# Patient Record
Sex: FEMALE | Race: BLACK OR AFRICAN AMERICAN | NOT HISPANIC OR LATINO | URBAN - METROPOLITAN AREA
[De-identification: names, ages, dates, MRNs, and addresses within clinical notes are randomized per-mention and may not be internally consistent; named-entity substitution may affect disease eponyms.]

---

## 2019-06-24 ENCOUNTER — EMERGENCY (EMERGENCY)
Facility: HOSPITAL | Age: 24
LOS: 0 days | Discharge: HOME | End: 2019-06-24
Attending: EMERGENCY MEDICINE | Admitting: EMERGENCY MEDICINE
Payer: MEDICAID

## 2019-06-24 ENCOUNTER — EMERGENCY (EMERGENCY)
Facility: HOSPITAL | Age: 24
LOS: 0 days | Discharge: HOME | End: 2019-06-24
Admitting: EMERGENCY MEDICINE
Payer: MEDICAID

## 2019-06-24 VITALS
TEMPERATURE: 98 F | HEIGHT: 63 IN | OXYGEN SATURATION: 98 % | RESPIRATION RATE: 20 BRPM | HEART RATE: 87 BPM | WEIGHT: 156.09 LBS | SYSTOLIC BLOOD PRESSURE: 117 MMHG | DIASTOLIC BLOOD PRESSURE: 69 MMHG

## 2019-06-24 VITALS
TEMPERATURE: 98 F | HEART RATE: 97 BPM | OXYGEN SATURATION: 98 % | RESPIRATION RATE: 18 BRPM | SYSTOLIC BLOOD PRESSURE: 124 MMHG | DIASTOLIC BLOOD PRESSURE: 59 MMHG

## 2019-06-24 DIAGNOSIS — K08.89 OTHER SPECIFIED DISORDERS OF TEETH AND SUPPORTING STRUCTURES: ICD-10-CM

## 2019-06-24 DIAGNOSIS — K02.9 DENTAL CARIES, UNSPECIFIED: ICD-10-CM

## 2019-06-24 DIAGNOSIS — F17.200 NICOTINE DEPENDENCE, UNSPECIFIED, UNCOMPLICATED: ICD-10-CM

## 2019-06-24 PROCEDURE — 99282 EMERGENCY DEPT VISIT SF MDM: CPT

## 2019-06-24 PROCEDURE — 99053 MED SERV 10PM-8AM 24 HR FAC: CPT

## 2019-06-24 PROCEDURE — 99283 EMERGENCY DEPT VISIT LOW MDM: CPT | Mod: 25

## 2019-06-24 RX ORDER — OXYCODONE AND ACETAMINOPHEN 5; 325 MG/1; MG/1
1 TABLET ORAL ONCE
Refills: 0 | Status: DISCONTINUED | OUTPATIENT
Start: 2019-06-24 | End: 2019-06-24

## 2019-06-24 RX ADMIN — OXYCODONE AND ACETAMINOPHEN 1 TABLET(S): 5; 325 TABLET ORAL at 03:03

## 2019-06-24 NOTE — ED PROVIDER NOTE - ATTENDING CONTRIBUTION TO CARE
23 year old female, no pmhx, presenting with a 2 year history of dental pain. Patient states she has not had full treatment due to having insurance issues. States today pain worsened so she came to the ED. Pain is sharp, non-radiating, worse with chewing, no palliative factors. Denies fevers, dysphagia, vomiting, voice changes, drooling, or other symptoms. Exam shows + caries to tooth # 15, 16, 17.  No signs of abscess. Will refer to dental clinic for further management.

## 2019-06-24 NOTE — ED PROVIDER NOTE - PROGRESS NOTE DETAILS
pt declined rx for abx or pain meds as she is leaving on a bus in an hr and will not be able to p/u rx; will call her dentist tomorrow for meds/visit pt declined rx for new abx as she is leaving on a bus in an hr and will not be able to p/u rx; will call her dentist tomorrow for meds/visit

## 2019-06-24 NOTE — ED PROVIDER NOTE - PROGRESS NOTE DETAILS
Toni: 24 y/o female with no pmhx presents with multiple teeth pain - patient was here earlier however dental clinic was not open at that time therefore patient presented now. Denies fevers/chills.  Exam: multiple dental caries in many different teeth, no swelling or purulent drainage noted.  Plan: follow up with dental clinic.

## 2019-06-24 NOTE — ED PROVIDER NOTE - NSFOLLOWUPCLINICS_GEN_ALL_ED_FT
A Dentist  Dentistry  .  [place]   Phone:   Fax:   Follow Up Time:     [facility]  Dental  [address] [place] [address]  Phone: [phone]  Fax:   Follow Up Time:

## 2019-06-24 NOTE — ED PROVIDER NOTE - CLINICAL SUMMARY MEDICAL DECISION MAKING FREE TEXT BOX
Patient presented with tooth pain x 2 years, otherwise afebrile, HD stable, clearing secretions without difficulty, airway patent. Referred to dental clinic for further management. Patient agreeable with plan.

## 2019-06-24 NOTE — ED PROVIDER NOTE - NS ED ROS FT
Eyes:  No visual changes, eye pain or discharge.  ENMT:  No hearing changes, pain, no sore throat or runny nose, no difficulty swallowing + dental pain  Cardiac:  No chest pain, SOB or edema. No chest pain with exertion.  Respiratory:  No cough or respiratory distress. No hemoptysis. No history of asthma or RAD.  GI:  No nausea, vomiting, diarrhea or abdominal pain.  :  No dysuria, frequency or burning.  MS:  No myalgia, muscle weakness, joint pain or back pain.  Neuro:  No headache or weakness.  No LOC.  Skin:  No skin rash.   Endocrine: No history of thyroid disease or diabetes.

## 2019-06-24 NOTE — ED PROVIDER NOTE - OBJECTIVE STATEMENT
right lower dental pain on/off for months as per pt  was told needs extraction, but waiting for ins approval to see OMFS  visiting from [place], getting on bus to go back tonight  pain is sharp, nonradiating, moderate  denies exacerbating or relieving factors  Denies fever/chill/HA/dizziness/chest pain/palpitation/sob/abd pain/n/v/d/ black stool/bloody stool/urinary sxs right lower dental pain on/off for months as per pt  was told needs extraction, but waiting for ins approval to see OMFS  visiting from [place], getting on bus to go back tonight  on amox and taking motrin without relief  pain is sharp, nonradiating, moderate  denies exacerbating or relieving factors  Denies fever/chill/HA/dizziness/chest pain/palpitation/sob/abd pain/n/v/d/ black stool/bloody stool/urinary sxs

## 2019-06-24 NOTE — ED PROVIDER NOTE - OBJECTIVE STATEMENT
23y F w/ no sig PMH presents with dental pain for 2 years. States has been having intermittent treatment secondary to insurance issues. Presents today as pain has worsened. Denies fever, chills, n/v/d, dysphagia, or numbness/tingling.

## 2019-06-24 NOTE — PROGRESS NOTE ADULT - SUBJECTIVE AND OBJECTIVE BOX
Patient is a 23y old  Female who presents with a chief complaint of     HPI:      PAST MEDICAL & SURGICAL HISTORY:  No pertinent past medical history    (   ) heart valve replacement  (   ) joint replacement  (   ) pregnancy    MEDICATIONS  (STANDING):    MEDICATIONS  (PRN):      REVIEW OF SYSTEMS      General:	    Skin/Breast:  	  Ophthalmologic:  	  ENMT:	    Respiratory and Thorax:  	  Cardiovascular:	    Gastrointestinal:	    Genitourinary:	    Musculoskeletal:	    Neurological:	    Psychiatric:	    Hematology/Lymphatics:	    Endocrine:	    Allergic/Immunologic:	    Allergies    No Known Allergies    Intolerances        FAMILY HISTORY:      *SOCIAL HISTORY: (   ) Tobacco; (   ) ETOH    Vital Signs Last 24 Hrs  T(C): 36.9 (24 Jun 2019 14:38), Max: 36.9 (24 Jun 2019 14:38)  T(F): 98.4 (24 Jun 2019 14:38), Max: 98.4 (24 Jun 2019 14:38)  HR: 97 (24 Jun 2019 14:38) (87 - 97)  BP: 124/59 (24 Jun 2019 14:38) (117/69 - 124/59)  BP(mean): --  RR: 18 (24 Jun 2019 14:38) (18 - 20)  SpO2: 98% (24 Jun 2019 14:38) (98% - 98%)    LABS:                  Last Dental Visit: <<  >>    EOE:  TMJ (   ) clicks                     (   ) pops                     (   ) crepitus             Mandible <<FROM>>             Facial bones and MOM <<grossly intact>>             (   ) trismus             (   ) lymphadenopathy             (   ) swelling             (   ) asymmetry             (   ) palpation             (   ) dyspnea             (   ) dysphagia             (   ) loss of consciousness    IOE:  <<permanent/primary/mixed>> dentition:            <<grossly intact>> OR             <<multiple carious teeth>> OR             <<multiple missing teeth>>             Dentition Present <<  >>                     Mobility <<  >>                     Caries <<  >>                hard/soft palate:  (   ) palatal torus, <<No pathology noted>>            tongue/FOM <<No pathology noted>>            labial/buccal mucosa <<No pathology noted>>           (   ) percussion           (   ) palpation           (   ) swelling            (   ) abscess           (   ) sinus tract    *DENTAL RADIOGRAPHS:    RADIOLOGY & ADDITIONAL STUDIES:    *ASSESSMENT:    *PLAN:    PROCEDURE:   Verbal and written consent given.  Anesthesia: <<    >>   Treatment: <<    >>     RECOMMENDATIONS:  1) <<   >>  2) Dental F/U with outpatient dentist for comprehensive dental care.   3) If any difficulty swallowing/breathing, fever occur, return to ER.     Resident Name, pager # Patient is a 23y old  Female who presents with a chief complaint of "I need two teeth taken out on the bottom left"    HPI: few months      PAST MEDICAL & SURGICAL HISTORY:  No pertinent past medical history    (   ) heart valve replacement  (   ) joint replacement  (   ) pregnancy    MEDICATIONS  (STANDING):    MEDICATIONS  (PRN):      REVIEW OF SYSTEMS      General:	    Skin/Breast:  	  Ophthalmologic:  	  ENMT:	    Respiratory and Thorax:  	  Cardiovascular:	    Gastrointestinal:	    Genitourinary:	    Musculoskeletal:	    Neurological:	    Psychiatric:	    Hematology/Lymphatics:	    Endocrine:	    Allergic/Immunologic:	    Allergies    No Known Allergies    Intolerances        FAMILY HISTORY:      *SOCIAL HISTORY: (   ) Tobacco; (   ) ETOH    Vital Signs Last 24 Hrs  T(C): 36.9 (24 Jun 2019 14:38), Max: 36.9 (24 Jun 2019 14:38)  T(F): 98.4 (24 Jun 2019 14:38), Max: 98.4 (24 Jun 2019 14:38)  HR: 97 (24 Jun 2019 14:38) (87 - 97)  BP: 124/59 (24 Jun 2019 14:38) (117/69 - 124/59)  BP(mean): --  RR: 18 (24 Jun 2019 14:38) (18 - 20)  SpO2: 98% (24 Jun 2019 14:38) (98% - 98%)    LABS:                  Last Dental Visit: << Patient did not report >>    EOE:  TMJ (   ) clicks                     (   ) pops                     (   ) crepitus             Mandible <<FROM>>             Facial bones and MOM <<grossly intact>>             (   ) trismus             (   ) lymphadenopathy             (   ) swelling             (   ) asymmetry             (   ) palpation             (   ) dyspnea             (   ) dysphagia             (   ) loss of consciousness    IOE:  retained root tip #18 and non-restorable #19                hard/soft palate:  (   ) palatal torus, <<No pathology noted>>            tongue/FOM <<No pathology noted>>            labial/buccal mucosa <<No pathology noted>>           ( x  ) percussion           ( x  ) palpation           (   ) swelling            (   ) abscess           (   ) sinus tract    *DENTAL RADIOGRAPHS: 1 periapical exposed    RADIOLOGY & ADDITIONAL STUDIES: none administered    *ASSESSMENT: nonrestorable #18 and 19 requiring extraction    *PLAN: extract #18 and 19    PROCEDURE:   Verbal and written consent given.  Anesthesia: <<  2 carpules 2% lidocaine and 2 carpules 4% septocaine with 1:100,000 epinephrine administered via inferior alveolar nerve block and infiltration  >>   Treatment: <<  Patient presents with pain lower left of a few weeks duration. Patient is aware that teeth need extraction as described by previous dentist in [place], [place]. 1 periapical exposed showing non-restorable #18 and 19. Advised extraction for patient, who described apprehension about local anesthesia. Treatment consequences discussed as OS sheet dated 7/13/00. SIde/site obtained. Local anesthesia administered. Attempted removal, however patient would not sit still for treatment. Patient crying in chair. Advised patient to follow up with insurance for sedation dentistry. Patient agreed. No treatment rendered.  >>     RECOMMENDATIONS:  1) << Patient currently on Amoxicillin and Ibuprofen prescription  >>  2) Dental F/U with outpatient dentist for comprehensive dental care.   3) If any difficulty swallowing/breathing, fever occur, return to ER.     Resident Name, pager # Adonis [phone]

## 2019-06-24 NOTE — ED PROVIDER NOTE - PHYSICAL EXAMINATION
CONSTITUTIONAL: Well-appearing; well-nourished; in no apparent distress.    ENT: +dental caries, poor dentition, +wisdom tooth erupting but impacted; otherwise normal nose; no rhinorrhea; normal pharynx with no tonsillar hypertrophy.   NECK: Supple; non-tender; no cervical lymphadenopathy. No JVD.   SKIN: Normal for age and race; warm; dry; good turgor; no apparent lesions or exudate.   NEURO/PSYCH: A & O x 4; grossly unremarkable. mood and manner are appropriate. Grooming and personal hygiene are appropriate. No apparent thoughts of harm to self or others.

## 2019-06-24 NOTE — ED PROVIDER NOTE - PHYSICAL EXAMINATION
CONSTITUTIONAL: Well-developed; well-nourished; in no acute distress.   SKIN: warm, dry  ENT: No nasal discharge; airway clear. + caries to tooth # 15, 16, 17   EXT: Normal ROM.  No clubbing, cyanosis or edema.   LYMPH: No acute cervical adenopathy.  NEURO: Alert, oriented, grossly unremarkable  PSYCH: Cooperative, appropriate.

## 2024-03-07 NOTE — ED PROVIDER NOTE - PRINCIPAL DIAGNOSIS
Spoke to pt, informed of Dr. Chavez's pt message below. Verb understanding.  Faxed Lab orders to [facility] in [place]. [phone]   Dentalgia

## 2024-11-10 NOTE — ED ADULT NURSE NOTE - CAS DISCH CONDITION
PULMONARY NOTE    ATTENDING PHYSICIAN:  Marcela Nix DO    Reason For Consultation: pleural effusion    Subjective:  On nasal cannula  Sp chest tube 11/4  Congested  Blood tinged purulent secretions in chest tube  Minimal drainage from chest tube    ACTIVE MEDS:  Current Facility-Administered Medications   Medication Dose Route Frequency Provider Last Rate Last Admin    HYDROcodone-acetaminophen (NORCO) 5-325 MG per tablet 1 tablet  1 tablet Oral Q4H PRN Karie Henderson PA-C        morphine injection 2 mg  2 mg Intravenous Q4H PRN Karie Henderson PA-C   2 mg at 11/04/24 2214    sodium chloride 0.9 % injection 10 mL  10 mL Intravenous PRN Nitza Faust PA-C   10 mL at 11/04/24 1338    acetaminophen (TYLENOL) tablet 650 mg  650 mg Oral Q4H PRN Nitza Faust PA-C        Or    acetaminophen (TYLENOL) suppository 650 mg  650 mg Rectal Q4H PRN Nitza Faust PA-C        melatonin tablet 3 mg  3 mg Oral Nightly PRN Nitza Faust PA-C   3 mg at 11/10/24 0120    albuterol (VENTOLIN) nebulizer 2.5 mg  2.5 mg Nebulization Q4H Resp PRN Nitza Faust PA-C   2.5 mg at 11/09/24 2235     Current Facility-Administered Medications   Medication Dose Route Frequency Provider Last Rate Last Admin    magnesium oxide (MAG-OX) tablet 400 mg  400 mg Oral BID PC Cavinder, Shira A, CNP   400 mg at 11/09/24 2153    alteplase (CATHFLO ACTIVASE) 10 mg in sodium chloride 0.9 % 30 mL for tube administration  10 mg Per Chest Tube Once Brayden Jennings DO        And    dornase donovan (PULMOZYME) 5 mg in sodium chloride 0.9 % 30 mL for tube administration  5 mg Per Chest Tube Once Brayden Jennings DO        And    sodium chloride 0.9 % injection 30 mL  30 mL Per Chest Tube Once Brayden Jennings DO        alteplase (CATHFLO ACTIVASE) 10 mg in sodium chloride 0.9 % 30 mL for tube administration  10 mg Per Chest Tube Once Marcela Nix DO        And    dornase donovan (PULMOZYME) 5 mg in sodium chloride 0.9 % 30 mL for tube administration  5  mg Per Chest Tube Once Marcela Nix DO        And    sodium chloride 0.9 % injection 30 mL  30 mL Per Chest Tube Once Marcela Nix DO        guaiFENesin (MUCINEX) ER tablet 1,200 mg  1,200 mg Oral 2 times per day Brayden Jennings DO   1,200 mg at 11/09/24 2152    cefTRIAXone (ROCEPHIN) 2,000 mg in sterile water (preservative free) IV syringe  2,000 mg Intravenous Daily Shanon Angeles PA-C   2,000 mg at 11/09/24 1046    metroNIDAZOLE (FLAGYL) tablet 500 mg  500 mg Oral TID Shanon Angeles PA-C   500 mg at 11/09/24 2153    lidocaine 1 % injection 100 mg  10 mL Injection Once Brayden Jennings DO        metoPROLOL succinate (TOPROL-XL) ER tablet 50 mg  50 mg Oral Daily Brian Lomax MD   50 mg at 11/09/24 0820    amLODIPine (NORVASC) tablet 10 mg  10 mg Oral Daily Nitza Faust PA-C   10 mg at 11/09/24 0821    fluticasone-vilanterol (BREO ELLIPTA) 200-25 MCG/ACT inhaler 1 puff  1 puff Inhalation Daily Resp Nitza Faust PA-C   1 puff at 11/08/24 0828    furosemide (LASIX) tablet 20 mg  20 mg Oral Daily Nitza Faust PA-C   20 mg at 11/09/24 0820    sodium chloride 0.9 % injection 2 mL  2 mL Intracatheter 2 times per day Nitza Faust PA-C   2 mL at 11/09/24 2153    Potassium Standard Replacement Protocol (Levels 3.5 and lower)   Does not apply See Admin Instructions Nitza Faust PA-C        Magnesium Standard Replacement Protocol   Does not apply See Admin Instructions Nitza Faust PA-C        spironolactone (ALDACTONE) tablet 25 mg  25 mg Oral Daily Brian Lomax MD   25 mg at 11/09/24 0820         PHYSICAL EXAM:   VITAL SIGNS:   Visit Vitals  /54   Pulse 63   Temp 97.3 °F (36.3 °C) (Oral)   Resp 18   Ht 5' 5\" (1.651 m)   Wt 49 kg (108 lb 0.4 oz)   SpO2 100%   BMI 17.98 kg/m²       Physical exam:    Patient is alert and awake with no distress.  Ill looking, pale, rapid shallow breathing    Head and neck exam: Pupils equally round, normal oral mucosa, neck is supple with no JVD.    Lungs:  Diminished breath sounds on the left lung field, tachypneic, dyspneic, rapid shallow breathing, on nasal cannula  Left chest tube in place    Cardiac exam: Regular rate and rhythm S1 and S2 normal. No murmur.    Abdominal exam: Soft nontender nondistended normoactive bowel sounds no organomegaly.    Extremities: no edema no clubbing or cyanosis palpable pulsatile lower extremities.     Skin Exam normal           DATA REVIEW:  CBC:   Recent Labs     11/09/24  0634 11/10/24  0633   WBC 6.8 5.4   RBC 3.58* 3.27*   HGB 10.0* 9.1*   HCT 32.1* 29.6*    153     CMP:  Recent Labs     11/08/24  0636 11/09/24  0634   SODIUM 136 133*   POTASSIUM 4.5 4.7   CHLORIDE 101 99   ANIONGAP 9 9   GLUCOSE 122* 112*   BUN 14 13   CREATININE 0.46* 0.45*   CALCIUM 8.3* 8.3*     Coagulation:   No results for input(s): \"INR\", \"PTT\" in the last 72 hours.    Invalid input(s): \"PTP\"    Cardiac markers: No results for input(s): \"TROPONINI\" in the last 72 hours.  ABGs: No results for input(s): \"PHARTERIAL\" in the last 72 hours.    Invalid input(s): \"CO2ART\", \"YDB3GSA\", \"PO2ART\", \"O2ART\"  Mg: No results for input(s): \"MAGNESIUM\" in the last 72 hours.  BNP: No results for input(s): \"BNP\" in the last 72 hours.    I&O:   Intake/Output Summary (Last 24 hours) at 11/10/2024 0751  Last data filed at 11/10/2024 0600  Gross per 24 hour   Intake --   Output 57 ml   Net -57 ml         IMAGING DATA:  CT CHEST WO CONTRAST   Final Result by Oneil Prakash MD (11/09 0049)   1.   Small loculated appearing left pleural effusion, decreased   significantly in size compared to prior examination. Improved aeration of   the left lung with persistent atelectasis and consolidative opacities at   the left lower lobe.   2.   New groundglass and airspace consolidation in the perihilar right   upper lobe suggesting pulmonary edema or infection.   3.   Advanced pulmonary fibrosis throughout the right lower lobe.   4.   Enlargement the main pulmonary artery  suggesting pulmonary arterial   hypertension.   5.   Cardiomegaly and mild coronary atherosclerosis.   6.   Splenomegaly, partially visualized.         Electronically Signed by: CASE PAYTON MD    Signed on: 11/9/2024 12:49 AM    Workstation ID: [id number]      XR CHEST AP OR PA   Final Result by Jared Duque DO (11/07 1332)   Improved left basilar pleural effusion with otherwise stable appearance of   airspace opacities throughout the left lung field and right lung base.         Electronically Signed by: JARED DUQUE DO    Signed on: 11/7/2024 1:32 PM    Workstation ID: [id number]      XR CHEST AP OR PA   Final Result by Jillian Mota MD (11/05 0720)   Impression:    1.   Interval placement of a left pigtail chest tube with decrease in the   pleural effusion. A moderate size left effusion still remains.   2.   Persistent patchy consolidations at the left lung which may present   asymmetric edema and/or infection.      Electronically Signed by: JILLIAN MOTA MD    Signed on: 11/5/2024 7:20 AM    Workstation ID: [id number]       VASC LOWER EXTREMITY VENOUS DUPLEX BILATERAL   Final Result by Orlando Sands DO (11/01 0803)      No evidence of deep vein thrombosis in either lower extremity.      Electronically Signed by: Orlando Sands DO    Signed on: 11/1/2024 8:03 AM    Workstation ID: [id number]      CT CHEST ABDOMEN PELVIS W CONTRAST   Final Result by Gordon Robert MD (11/01 0339)   1.  Motion artifact limits evaluation.   2.  Large left pleural effusion which appears to be somewhat loculated in    nature.  Additional mild thickening of the pleural lining. Additional    loculated fluid within the left fissure. Findings may be due in part of    volume overload.  Infection also possible.   3.  Prominent left lung airspace disease which may be due to compressive    atelectasis.  Superimposed infection is possible.   4.  Scattered pulmonary nodules noted within the aerated lungs  which may    be infectious in nature.   5.  Scattered dose of bronchiectasis noted with honeycombing in the right    lower lobe and architectural distortion.  Findings may be related to    interstitial lung disease.   6.  Prominent and enlarged mediastinal left nodes, the largest a    prevascular lymph node measuring up to 2.5 cm in short axis.  Findings    may be infectious or inflammatory in nature.  Neoplasm also possible.   7.  Splenomegaly.   8.  Healing left superior and inferior pubic rami fractures.   9.  Healing left sacral fracture.   10.  Hypodensities in the uterus which may relate to fibroids.  Consider    pelvic ultrasound if there is further concern.   11.  No evidence of bowel obstruction.   12.  No other acute findings.   13.  Incidental findings as described.            Electronically signed by Gordon Robert MD on 11 01 24 at 03:39      XR CHEST AP OR PA   Final Result by Ephraim Hickey MD (10/31 1800)      1.   Significant consolidation in the left midlung and left lung base with   moderately sized left-sided pleural effusion. Underlying mass not excluded.   2.   Interstitial airspace opacities in the right midlung and right lung   base.            Electronically Signed by: EPHRAIM HICKEY MD    Signed on: 10/31/2024 6:00 PM    Workstation ID: [id number]            Impression/Plan:     Assessment  ARF- hypoxic  L empyema  Left lower lung atelectasis  Right lower lung fibrosis and ground glass opacities  Mediastinal lymphadenoapthy  Splenomegaly  Pulm HTN  Diastolic CHF    ILD ---> with pulmonary fibrosis and honey combing which could be IPF---> need outpt follow up based on her progress    Plan:  Cultures positive for strep intermedius  Abx adjusted per ID  Eval for dental infections and aspiration  S/p TPA dornase 11/6  Will hold off on further tpa dornase due to blood tinged secretions  CT chest is better  Pts son notes he would not want her to undergo surgery so will not consider  VATS for now  Likely will be able to discontinue chest tube Monday   Added chest PT  On 2L NC  Titrate down off nasal cannula to maintain sat 90-95%    D/w son at bedside  Showed him the CT  Aware of the plan'    T35 min    [facility]  11/10/2024, 7:51 AM       Stable